# Patient Record
Sex: FEMALE | Race: WHITE | NOT HISPANIC OR LATINO | URBAN - METROPOLITAN AREA
[De-identification: names, ages, dates, MRNs, and addresses within clinical notes are randomized per-mention and may not be internally consistent; named-entity substitution may affect disease eponyms.]

---
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
## 2020-12-03 ENCOUNTER — APPOINTMENT (OUTPATIENT)
Dept: CARDIOLOGY | Facility: CLINIC | Age: 20
End: 2020-12-03
Payer: COMMERCIAL

## 2020-12-03 VITALS
HEIGHT: 67 IN | WEIGHT: 212 LBS | BODY MASS INDEX: 33.27 KG/M2 | TEMPERATURE: 97.3 F | HEART RATE: 77 BPM | DIASTOLIC BLOOD PRESSURE: 56 MMHG | SYSTOLIC BLOOD PRESSURE: 98 MMHG

## 2020-12-03 DIAGNOSIS — R00.2 PALPITATIONS: ICD-10-CM

## 2020-12-03 DIAGNOSIS — R07.9 CHEST PAIN, UNSPECIFIED: ICD-10-CM

## 2020-12-03 PROCEDURE — 99214 OFFICE O/P EST MOD 30 MIN: CPT

## 2020-12-03 PROCEDURE — 99072 ADDL SUPL MATRL&STAF TM PHE: CPT

## 2020-12-03 PROCEDURE — 93000 ELECTROCARDIOGRAM COMPLETE: CPT

## 2020-12-03 RX ORDER — ALBUTEROL SULFATE 90 UG/1
108 (90 BASE) AEROSOL, METERED RESPIRATORY (INHALATION) AS DIRECTED
Refills: 0 | Status: ACTIVE | COMMUNITY

## 2020-12-30 PROBLEM — R00.2 PALPITATIONS: Status: ACTIVE | Noted: 2018-07-16

## 2020-12-30 PROBLEM — R07.9 CHEST PAIN: Status: ACTIVE | Noted: 2020-12-30

## 2020-12-30 NOTE — DISCUSSION/SUMMARY
[FreeTextEntry1] : No further cardiac testing required at present.\par Clinical monitoring.\par Exercise / weight loss.\par PMD follow-up.\par Follow-up prn.

## 2020-12-30 NOTE — HISTORY OF PRESENT ILLNESS
[FreeTextEntry1] : 18-year-old female presents for palpitations.\par \par Questionable ASD detected in utero. No other cardiac history. No clear risk factors.\par \par Palpitations for approximately one year. 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 Generally brief (seconds). Not exertional. No clear precipitants.  No excessive stimulants.  Occasionally associated with lightheadedness. No syncope.\par \par Pulse 115 when taken in class on pulse oximeter (training).  Subsequently obtained similar reading on own. \par \par Exercises regularly (aerobics / light calisthenics) without symptoms. No chest pain. Breathing comfortable. \par \par No recent cardiac testing\par \par 5/29/18 labs reviewed:\par      HbA1c 5.3\par CBC,  CMP, TSH otherwise unremarkable.\par    HDL 77

## 2020-12-30 NOTE — REASON FOR VISIT
[Palpitations] : palpitations [Follow-Up - Clinic] : a clinic follow-up of [Chest Pain] : chest pain [FreeTextEntry1] : Last seen 2-y ago.\par \par Overall, feels well.\par \par New chest discomfort.  Random / occurs at rest.  Lower chest extending into back and b/l flanks.  Self-limited.  Lasts several minutes.  No exacerbating alleviating.  No associated symptoms.  Never exertional.  Finishing college (now remote at home) and father is ill, but does not correlate with stress.\par \par Was exercising up to pandemic without exertional symptoms (cardio / weights).\par \par Breathing comfortable.\par \par Stable mild palpitations.  Overall improved.  No lightheadedness / syncope.\par \par Occasional GERD symptoms.\par \par ECHO (7/16/18): nL chambers / biventricular function.  Mild PI.

## 2020-12-30 NOTE — ASSESSMENT
[FreeTextEntry1] : Palpitations likely benign extrasystoles or ST.\par Improved.  Reassuring ECHO.\par \par Chest pain not anginal.\par Likely musculoskeletal.  Possibly GERD.\par Very low pretest probability for CAD.\par \par ECHO (7/16/18): nL chambers / biventricular function.  Mild PI.

## 2021-11-08 ENCOUNTER — APPOINTMENT (OUTPATIENT)
Dept: OTOLARYNGOLOGY | Facility: CLINIC | Age: 21
End: 2021-11-08
Payer: COMMERCIAL

## 2021-11-08 VITALS — WEIGHT: 180 LBS | BODY MASS INDEX: 28.93 KG/M2 | HEIGHT: 66 IN

## 2021-11-08 DIAGNOSIS — H93.8X2 OTHER SPECIFIED DISORDERS OF LEFT EAR: ICD-10-CM

## 2021-11-08 DIAGNOSIS — J34.89 OTHER SPECIFIED DISORDERS OF NOSE AND NASAL SINUSES: ICD-10-CM

## 2021-11-08 PROCEDURE — G0268 REMOVAL OF IMPACTED WAX MD: CPT

## 2021-11-08 PROCEDURE — 92550 TYMPANOMETRY & REFLEX THRESH: CPT

## 2021-11-08 PROCEDURE — 92557 COMPREHENSIVE HEARING TEST: CPT

## 2021-11-08 PROCEDURE — 31231 NASAL ENDOSCOPY DX: CPT

## 2021-11-08 PROCEDURE — 99203 OFFICE O/P NEW LOW 30 MIN: CPT | Mod: 25

## 2021-11-08 NOTE — PHYSICAL EXAM
[Midline] : trachea located in midline position [Normal] : no rashes [de-identified] : cerumen impaction left ear, removed with curette and suction

## 2021-11-08 NOTE — HISTORY OF PRESENT ILLNESS
[de-identified] : Patient presents today c/o clogged left ear, scabbing in the right nostril. Patient admits using Q tip in the left ear on Saturday. No pain. \par \par Patient also c/o scabbing in the right nostril. No epistaxis. Some nasal congestion.

## 2021-11-08 NOTE — REASON FOR VISIT
[Initial Evaluation] : an initial evaluation for [FreeTextEntry2] : clogged left ear, scabbing in the right nostril

## 2021-11-08 NOTE — ASSESSMENT
[FreeTextEntry1] : I reviewed, interpreted, and discussed the Audiogram done today. WNL.\par \par \par Right septal scab for 3 days. Use bacitracin for 1 week. RTC in 1 week if persistent.

## 2022-02-08 ENCOUNTER — NON-APPOINTMENT (OUTPATIENT)
Age: 22
End: 2022-02-08

## 2022-02-19 ENCOUNTER — APPOINTMENT (OUTPATIENT)
Age: 22
End: 2022-02-19
Payer: COMMERCIAL

## 2022-02-19 ENCOUNTER — OUTPATIENT (OUTPATIENT)
Dept: OUTPATIENT SERVICES | Facility: HOSPITAL | Age: 22
LOS: 1 days | Discharge: HOME | End: 2022-02-19
Payer: COMMERCIAL

## 2022-02-19 VITALS
WEIGHT: 194 LBS | OXYGEN SATURATION: 98 % | HEIGHT: 66 IN | HEART RATE: 61 BPM | BODY MASS INDEX: 31.18 KG/M2 | SYSTOLIC BLOOD PRESSURE: 100 MMHG | DIASTOLIC BLOOD PRESSURE: 60 MMHG

## 2022-02-19 DIAGNOSIS — R05.9 COUGH, UNSPECIFIED: ICD-10-CM

## 2022-02-19 DIAGNOSIS — Z86.59 PERSONAL HISTORY OF OTHER MENTAL AND BEHAVIORAL DISORDERS: ICD-10-CM

## 2022-02-19 PROCEDURE — 71045 X-RAY EXAM CHEST 1 VIEW: CPT | Mod: 26

## 2022-02-19 PROCEDURE — 99203 OFFICE O/P NEW LOW 30 MIN: CPT

## 2022-02-19 RX ORDER — ALBUTEROL SULFATE 90 UG/1
108 (90 BASE) INHALANT RESPIRATORY (INHALATION)
Qty: 3 | Refills: 3 | Status: ACTIVE | COMMUNITY
Start: 2022-02-19 | End: 1900-01-01

## 2022-02-19 RX ORDER — BENZONATATE 200 MG/1
200 CAPSULE ORAL
Qty: 21 | Refills: 0 | Status: ACTIVE | COMMUNITY
Start: 2022-02-19 | End: 1900-01-01

## 2022-02-19 NOTE — REVIEW OF SYSTEMS
[Cough] : cough [Wheezing] : wheezing [Anxiety] : anxiety [TextBox_14] : episodes of runny nose  [Negative] : Endocrine

## 2022-02-19 NOTE — PHYSICAL EXAM
[No Acute Distress] : no acute distress [Normal Oropharynx] : normal oropharynx [II] : Mallampati Class: II [Normal Appearance] : normal appearance [No Neck Mass] : no neck mass [Normal Rate/Rhythm] : normal rate/rhythm [No Murmurs] : no murmurs [Normal S1, S2] : normal s1, s2 [No Resp Distress] : no resp distress [Clear to Auscultation Bilaterally] : clear to auscultation bilaterally [No Abnormalities] : no abnormalities [Normal Gait] : normal gait [No Clubbing] : no clubbing [No Cyanosis] : no cyanosis [FROM] : FROM [No Edema] : no edema [No Focal Deficits] : no focal deficits [Oriented x3] : oriented x3 [Normal Affect] : normal affect

## 2022-05-14 ENCOUNTER — APPOINTMENT (OUTPATIENT)
Age: 22
End: 2022-05-14
Payer: COMMERCIAL

## 2022-05-14 VITALS
HEIGHT: 66 IN | RESPIRATION RATE: 14 BRPM | BODY MASS INDEX: 31.66 KG/M2 | WEIGHT: 197 LBS | DIASTOLIC BLOOD PRESSURE: 70 MMHG | HEART RATE: 78 BPM | OXYGEN SATURATION: 98 % | SYSTOLIC BLOOD PRESSURE: 120 MMHG

## 2022-05-14 DIAGNOSIS — J45.991 COUGH VARIANT ASTHMA: ICD-10-CM

## 2022-05-14 PROCEDURE — 99213 OFFICE O/P EST LOW 20 MIN: CPT

## 2022-05-14 NOTE — PHYSICAL EXAM
[No Acute Distress] : no acute distress [Normal Oropharynx] : normal oropharynx [II] : Mallampati Class: II [Normal Appearance] : normal appearance [No Neck Mass] : no neck mass [Normal Rate/Rhythm] : normal rate/rhythm [No Murmurs] : no murmurs [Normal S1, S2] : normal s1, s2 [No Resp Distress] : no resp distress [Clear to Auscultation Bilaterally] : clear to auscultation bilaterally [No Abnormalities] : no abnormalities [Normal Gait] : normal gait [No Clubbing] : no clubbing [No Cyanosis] : no cyanosis [No Edema] : no edema [FROM] : FROM [No Focal Deficits] : no focal deficits [Oriented x3] : oriented x3 [Normal Affect] : normal affect

## 2022-05-14 NOTE — REVIEW OF SYSTEMS
[Cough] : cough [Wheezing] : wheezing [Anxiety] : anxiety [Negative] : Endocrine [TextBox_14] : episodes of runny nose

## 2022-07-07 ENCOUNTER — OUTPATIENT (OUTPATIENT)
Dept: OUTPATIENT SERVICES | Facility: HOSPITAL | Age: 22
LOS: 1 days | Discharge: HOME | End: 2022-07-07

## 2022-07-07 DIAGNOSIS — J45.991 COUGH VARIANT ASTHMA: ICD-10-CM

## 2022-07-07 PROCEDURE — 94727 GAS DIL/WSHOT DETER LNG VOL: CPT | Mod: 26

## 2022-07-07 PROCEDURE — 94729 DIFFUSING CAPACITY: CPT | Mod: 26

## 2022-07-07 PROCEDURE — 94060 EVALUATION OF WHEEZING: CPT | Mod: 26

## 2022-08-06 ENCOUNTER — APPOINTMENT (OUTPATIENT)
Age: 22
End: 2022-08-06

## 2023-06-15 RX ORDER — FLUTICASONE FUROATE AND VILANTEROL TRIFENATATE 100; 25 UG/1; UG/1
100-25 POWDER RESPIRATORY (INHALATION)
Qty: 1 | Refills: 1 | Status: ACTIVE | COMMUNITY
Start: 2022-02-19 | End: 1900-01-01

## 2023-06-27 ENCOUNTER — NON-APPOINTMENT (OUTPATIENT)
Age: 23
End: 2023-06-27

## 2023-06-28 ENCOUNTER — APPOINTMENT (OUTPATIENT)
Dept: OTOLARYNGOLOGY | Facility: CLINIC | Age: 23
End: 2023-06-28
Payer: COMMERCIAL

## 2023-06-28 PROCEDURE — 99214 OFFICE O/P EST MOD 30 MIN: CPT | Mod: 25

## 2023-06-28 PROCEDURE — 31575 DIAGNOSTIC LARYNGOSCOPY: CPT

## 2023-06-28 RX ORDER — FAMOTIDINE 20 MG/1
20 TABLET, FILM COATED ORAL
Qty: 90 | Refills: 2 | Status: ACTIVE | COMMUNITY
Start: 2023-06-28 | End: 1900-01-01

## 2023-06-28 RX ORDER — FLUTICASONE PROPIONATE 50 UG/1
50 SPRAY, METERED NASAL
Qty: 1 | Refills: 4 | Status: ACTIVE | COMMUNITY
Start: 2023-06-28 | End: 1900-01-01

## 2023-06-28 NOTE — HISTORY OF PRESENT ILLNESS
[FreeTextEntry1] : \par Swollen tonsils with white spots that occurs monthly for last 4 months, was tested for strep & mono by [facility] [place] which was (-). Denies fevers, chills. Had difficulty swallowing and eating due to increased pain that last for about a week. Prescribed Amoxicillin with no improvement.

## 2023-06-28 NOTE — PROCEDURE
[None] : none [Flexible Endoscope] : examined with the flexible endoscope [Normal] : normal pyriform sinuses without saliva pooling [de-identified] : throat discomfort

## 2023-06-28 NOTE — ASSESSMENT
[FreeTextEntry1] : Discussed pathophysiology of tonsil stones and recommended gargling with baking soda and using waterpick.\par \par RTC in2M to evaluate if she needs surgery. \par

## 2023-08-21 ENCOUNTER — APPOINTMENT (OUTPATIENT)
Dept: OTOLARYNGOLOGY | Facility: CLINIC | Age: 23
End: 2023-08-21
Payer: COMMERCIAL

## 2023-08-21 DIAGNOSIS — R49.0 DYSPHONIA: ICD-10-CM

## 2023-08-21 PROCEDURE — 99214 OFFICE O/P EST MOD 30 MIN: CPT | Mod: 25

## 2023-08-21 PROCEDURE — 31575 DIAGNOSTIC LARYNGOSCOPY: CPT

## 2023-08-21 NOTE — ASSESSMENT
[FreeTextEntry1] : Recurrent acute pharyngitis. No clear history of strep.   Recurrent tonsil stones.  Patient aware of risks and benefits from tonsillectomy regaring her tonsil stones. SHe understands she will continue to have viral pharyngitis after surgery.

## 2023-08-21 NOTE — PHYSICAL EXAM
[Normal] : mucosa is normal [Midline] : trachea located in midline position [de-identified] : hypertrophic

## 2023-08-21 NOTE — HISTORY OF PRESENT ILLNESS
[FreeTextEntry1] : Patient returns today c/o throat discomfort, tonsil stone, PND. Fluticasone and Famotidine .   Patient states in July she lost her voice for 4 days and a few days ago she had a tonsil infection. The right tonsil is the worst. Reports having 7 throat infections, but was given antibiotics for 1 of the occurrences.  Patient took flonase, famotidine, salt water and baking soda gargles without improvement of throat discomfort and PND.

## 2023-08-21 NOTE — REASON FOR VISIT
[Subsequent Evaluation] : a subsequent evaluation for [FreeTextEntry2] : throat discomfort, tonsil stone, PND

## 2023-09-12 ENCOUNTER — APPOINTMENT (OUTPATIENT)
Dept: NEUROLOGY | Facility: CLINIC | Age: 23
End: 2023-09-12

## 2023-10-06 DIAGNOSIS — K58.0 IRRITABLE BOWEL SYNDROME WITH DIARRHEA: ICD-10-CM

## 2023-10-06 RX ORDER — DICYCLOMINE HYDROCHLORIDE 10 MG/1
10 CAPSULE ORAL 3 TIMES DAILY
Qty: 90 | Refills: 0 | Status: ACTIVE | COMMUNITY
Start: 2023-10-06 | End: 1900-01-01

## 2023-10-31 ENCOUNTER — RX RENEWAL (OUTPATIENT)
Age: 23
End: 2023-10-31
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
## 2023-11-16 RX ORDER — CEPHALEXIN 500 MG/1
500 CAPSULE ORAL 3 TIMES DAILY
Qty: 21 | Refills: 0 | Status: ACTIVE | COMMUNITY
Start: 2023-11-16 | End: 1900-01-01

## 2023-12-06 ENCOUNTER — APPOINTMENT (OUTPATIENT)
Dept: OTOLARYNGOLOGY | Facility: CLINIC | Age: 23
End: 2023-12-06
Payer: COMMERCIAL

## 2023-12-06 DIAGNOSIS — J35.8 OTHER CHRONIC DISEASES OF TONSILS AND ADENOIDS: ICD-10-CM

## 2023-12-06 DIAGNOSIS — R09.82 POSTNASAL DRIP: ICD-10-CM

## 2023-12-06 DIAGNOSIS — R07.0 PAIN IN THROAT: ICD-10-CM

## 2023-12-06 PROCEDURE — 31575 DIAGNOSTIC LARYNGOSCOPY: CPT

## 2023-12-06 PROCEDURE — 99213 OFFICE O/P EST LOW 20 MIN: CPT | Mod: 25

## 2023-12-20 ENCOUNTER — TRANSCRIPTION ENCOUNTER (OUTPATIENT)
Age: 23
End: 2023-12-20

## 2024-01-02 ENCOUNTER — LABORATORY RESULT (OUTPATIENT)
Age: 24
End: 2024-01-02

## 2024-01-02 ENCOUNTER — OUTPATIENT (OUTPATIENT)
Dept: OUTPATIENT SERVICES | Facility: HOSPITAL | Age: 24
LOS: 1 days | End: 2024-01-02
Payer: COMMERCIAL

## 2024-01-02 ENCOUNTER — APPOINTMENT (OUTPATIENT)
Dept: HEMATOLOGY ONCOLOGY | Facility: CLINIC | Age: 24
End: 2024-01-02
Payer: COMMERCIAL

## 2024-01-02 VITALS
BODY MASS INDEX: 37.12 KG/M2 | HEIGHT: 66 IN | DIASTOLIC BLOOD PRESSURE: 65 MMHG | SYSTOLIC BLOOD PRESSURE: 118 MMHG | TEMPERATURE: 98.3 F | HEART RATE: 85 BPM | RESPIRATION RATE: 14 BRPM | WEIGHT: 231 LBS

## 2024-01-02 DIAGNOSIS — D72.819 DECREASED WHITE BLOOD CELL COUNT, UNSPECIFIED: ICD-10-CM

## 2024-01-02 DIAGNOSIS — R70.0 ELEVATED ERYTHROCYTE SEDIMENTATION RATE: ICD-10-CM

## 2024-01-02 LAB
HCT VFR BLD CALC: 41.7 %
HGB BLD-MCNC: 13.8 G/DL
MCHC RBC-ENTMCNC: 26.2 PG
MCHC RBC-ENTMCNC: 33.1 G/DL
MCV RBC AUTO: 79.3 FL
PLATELET # BLD AUTO: 431 K/UL
PMV BLD: 9.6 FL
RBC # BLD: 5.26 M/UL
RBC # FLD: 14 %
WBC # FLD AUTO: 6.42 K/UL

## 2024-01-02 PROCEDURE — 86140 C-REACTIVE PROTEIN: CPT

## 2024-01-02 PROCEDURE — 84443 ASSAY THYROID STIM HORMONE: CPT

## 2024-01-02 PROCEDURE — 81001 URINALYSIS AUTO W/SCOPE: CPT

## 2024-01-02 PROCEDURE — 99244 OFF/OP CNSLTJ NEW/EST MOD 40: CPT

## 2024-01-02 PROCEDURE — 86880 COOMBS TEST DIRECT: CPT

## 2024-01-02 PROCEDURE — 85652 RBC SED RATE AUTOMATED: CPT

## 2024-01-02 PROCEDURE — 84156 ASSAY OF PROTEIN URINE: CPT

## 2024-01-02 PROCEDURE — 86225 DNA ANTIBODY NATIVE: CPT

## 2024-01-02 PROCEDURE — 82550 ASSAY OF CK (CPK): CPT

## 2024-01-02 PROCEDURE — 99204 OFFICE O/P NEW MOD 45 MIN: CPT

## 2024-01-02 PROCEDURE — 82570 ASSAY OF URINE CREATININE: CPT

## 2024-01-02 PROCEDURE — 83516 IMMUNOASSAY NONANTIBODY: CPT

## 2024-01-02 PROCEDURE — 85027 COMPLETE CBC AUTOMATED: CPT

## 2024-01-02 PROCEDURE — 80053 COMPREHEN METABOLIC PANEL: CPT

## 2024-01-02 PROCEDURE — 86235 NUCLEAR ANTIGEN ANTIBODY: CPT

## 2024-01-03 DIAGNOSIS — D72.819 DECREASED WHITE BLOOD CELL COUNT, UNSPECIFIED: ICD-10-CM

## 2024-01-03 LAB
ALBUMIN SERPL ELPH-MCNC: 4.2 G/DL
ALP BLD-CCNC: 84 U/L
ALT SERPL-CCNC: 11 U/L
ANION GAP SERPL CALC-SCNC: 11 MMOL/L
APPEARANCE: CLEAR
AST SERPL-CCNC: 13 U/L
BILIRUB SERPL-MCNC: 0.4 MG/DL
BILIRUBIN URINE: NEGATIVE
BLOOD URINE: ABNORMAL
BUN SERPL-MCNC: 12 MG/DL
CALCIUM SERPL-MCNC: 9.6 MG/DL
CHLORIDE SERPL-SCNC: 102 MMOL/L
CK SERPL-CCNC: 60 U/L
CO2 SERPL-SCNC: 25 MMOL/L
COLOR: YELLOW
CREAT SERPL-MCNC: 0.9 MG/DL
CREAT SPEC-SCNC: 221 MG/DL
CREAT/PROT UR: 0.1 RATIO
CRP SERPL-MCNC: 21.9 MG/L
DIRECT COOMBS: NORMAL
EGFR: 92 ML/MIN/1.73M2
ENA RNP AB SER IA-ACNC: 1.2 AL
ENA SCL70 IGG SER IA-ACNC: <0.2 AL
ERYTHROCYTE [SEDIMENTATION RATE] IN BLOOD BY WESTERGREN METHOD: 24 MM/HR
GLUCOSE QUALITATIVE U: NEGATIVE MG/DL
GLUCOSE SERPL-MCNC: 86 MG/DL
KETONES URINE: NEGATIVE MG/DL
LEUKOCYTE ESTERASE URINE: NEGATIVE
NITRITE URINE: NEGATIVE
PH URINE: 5.5
POTASSIUM SERPL-SCNC: 4.5 MMOL/L
PROT SERPL-MCNC: 7.5 G/DL
PROT UR-MCNC: 12 MG/DLG/24H
PROTEIN URINE: NEGATIVE MG/DL
SODIUM SERPL-SCNC: 138 MMOL/L
SPECIFIC GRAVITY URINE: 1.03
TSH SERPL-ACNC: 1.12 UIU/ML
UROBILINOGEN URINE: 0.2 MG/DL

## 2024-01-03 NOTE — CONSULT LETTER
[Dear  ___] : Dear  [unfilled], [Consult Letter:] : I had the pleasure of evaluating your patient, [unfilled]. [Please see my note below.] : Please see my note below. [Consult Closing:] : Thank you very much for allowing me to participate in the care of this patient.  If you have any questions, please do not hesitate to contact me. [Sincerely,] : Sincerely, [FreeTextEntry3] : Dr. KARIN Navarro

## 2024-01-03 NOTE — HISTORY OF PRESENT ILLNESS
[de-identified] : cc: I have elevated ESR/CRP  23F w/a history of anxiety presents to the hematology clinic due to elevated ESR and CRP.   History goes back to November when pt had a persistent sore throat. Workup including strep, EBV, and COVID testing were negative. At the end of November, she subsequently developed three indurations on her left forearm. On 12/10/23, she also developed R outer ear swelling, and on 12/18/23, she developed R cervical lymphadenopathy. She went to her PCP and had a series of bloodwork, including ESR, CRP, thyroglobulin antibodies, and EFREN, which were all elevated. ESR - 28, CRP- 22.5, EFREN - 1:160, thyroglobulin ab - 127, and thyroid peroxide ab - 328. She also had lymphopenia with an ALC of 630. She was sent to hematology for further evaluation.  Today, the pt reports the resolution of her R ear swelling, R lymphadenopathy, and L forearm indurations with residual scarring. She presents with images of her ear, which appeared to be perichondritis with associated R cervical lymphadenopathy. She states she still has a persistent sore throat. She notices malar rashes on her face. Otherwise, she denies fever, chill, night sweats, CP, bowel movement changes, and joint pain.   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 She lives in a dorm with three other roommates. One of her roommates has a dog. She gained 30 lbs over 1 year.   PSH: None Social: social alcohol use, denies illicit drug use HCM: Had injectable birth control  FHx: Grandfather (maternal) - CAD, Grandmother (maternal) - scleroderma, and mother -Trophoblastic tumor

## 2024-01-03 NOTE — ASSESSMENT
[FreeTextEntry1] : 23F presents to the hematology clinic for elevation for elevated ESR/CRP.   # Elevated ESR/CRP associated with sore throat , auricular  perichondritis ? cervical adenopathy , painful nodular skin lesions , all nearly resolved. - Labs and physical exam findings, including leukopenia , elevated EFREN, thyroglobulin ab, and thyroperoxidase ab,  malar rash ? rule out infectious v/s autoimmune disorders ( positive family history ) - We also explained that ESR/CRP elevation is non-specific and can be elevated due to infection, obesity, autoimmune disorder, and malignancy.  - will check anti-Scl-70, anti-RNA polymerase III antibody, EFREN, as well as CK level  - will repeat ESR/CRP  - will check UA, urine protein/cr ratio, and direct lupillo - consider rheumatology evaluation .   # Elevated thyroglobulin ab and thyroid peroxide ab r/o graves dz and Hashimoto dz  - check TSH and FT4  # Lymphopenia, resolved on repeat CBC today   RTC based on the above workup result.

## 2024-01-03 NOTE — REVIEW OF SYSTEMS
[Recent Change In Weight] : ~T recent weight change [Negative] : Endocrine [Fever] : no fever [Chills] : no chills [Night Sweats] : no night sweats [Fatigue] : no fatigue [Easy Bleeding] : no tendency for easy bleeding [Easy Bruising] : no tendency for easy bruising [FreeTextEntry2] : gained 30 lbs over 1 year [de-identified] : R cervical lymphadenopathy, resolved

## 2024-01-03 NOTE — REASON FOR VISIT
[Initial Consultation] : an initial consultation for [Parent] : parent [FreeTextEntry2] : Elevated ESR/CRP

## 2024-01-03 NOTE — PHYSICAL EXAM
[facility] -- Stefano Emmanuel, Aleksandar, Ricky, Ramsey Quan Savella  Office # [phone]      Follow Up:  BOB    Subjective/Observations: Patient seen and examined. Events noted. Resting comfortably in bed. No complaints of chest pain, dyspnea, or palpitations reported. No signs of orthopnea or PND. s/p right renal PCI      REVIEW OF SYSTEMS: All other review of systems is negative unless indicated above    PAST MEDICAL & SURGICAL HISTORY:  Renal artery stenosis: possible on doppler  Basal cell carcinoma  Mitral valve insufficiency, unspecified etiology: Cardiac cath on 11/21/18  Edema, unspecified type  Cerebrovascular accident (CVA), unspecified mechanism  Hyponatremia: ON HCTZ , h/o Hypokelemia  Dyslipidemia  Essential hypertension  UTI (urinary tract infection)  H/O bilateral hip replacements  History of cholecystectomy  Status post hysterectomy  S/P Mohs surgery for basal cell carcinoma      MEDICATIONS  (STANDING):  aspirin  chewable 81 milliGRAM(s) Oral daily  atorvastatin 40 milliGRAM(s) Oral at bedtime  cloNIDine 0.2 milliGRAM(s) Oral three times a day  clopidogrel Tablet 75 milliGRAM(s) Oral daily  hydrALAZINE 25 milliGRAM(s) Oral every 8 hours  losartan 50 milliGRAM(s) Oral every 12 hours  sodium chloride 0.9%. 250 milliLiter(s) (250 mL/Hr) IV Continuous <Continuous>  sodium chloride 0.9%. 1000 milliLiter(s) (75 mL/Hr) IV Continuous <Continuous>    MEDICATIONS  (PRN):      Allergies    Keflex (Unknown)  verapamil (Other)    Intolerances            Vital Signs Last 24 Hrs  T(C): 36.6 (19 Dec 2018 05:14), Max: 36.8 (18 Dec 2018 21:20)  T(F): 97.9 (19 Dec 2018 05:14), Max: 98.3 (18 Dec 2018 21:20)  HR: 76 (19 Dec 2018 06:40) (61 - 91)  BP: 134/65 (19 Dec 2018 06:40) (134/65 - 194/88)  BP(mean): --  RR: 16 (19 Dec 2018 05:14) (16 - 18)  SpO2: 99% (19 Dec 2018 05:14) (94% - 99%)    I&O's Summary    18 Dec 2018 07:01  -  19 Dec 2018 07:00  --------------------------------------------------------  IN: 360 mL / OUT: 650 mL / NET: -290 mL      Weight (kg): 51.7 (12-18 @ 16:50)    PHYSICAL EXAM:  TELE:   Constitutional: NAD, awake and alert, well-developed  HEENT: Moist Mucous Membranes, Anicteric  Pulmonary: Decreased breath sounds b/l. No rales, crackles or wheeze appreciated.   Cardiovascular: Regular, S1 and S2, 1/6 SM  Gastrointestinal: Bowel Sounds present, soft, nontender.   Lymph: trace peripheral edema. No lymphadenopathy.  Skin: No visible rashes or ulcers.  Psych:  Mood & affect appropriate for situation    LABS: All Labs Reviewed:                        10.9   6.5   )-----------( 326      ( 19 Dec 2018 00:35 )             34.1                         11.4   6.00  )-----------( 323      ( 18 Dec 2018 08:10 )             36.6                         10.6   6.78  )-----------( 333      ( 17 Dec 2018 07:46 )             33.7     19 Dec 2018 06:28    144    |  108    |  29     ----------------------------<  131    4.0     |  24     |  0.90   19 Dec 2018 00:35    144    |  107    |  33     ----------------------------<  128    5.5     |  26     |  1.02   18 Dec 2018 06:48    142    |  104    |  45     ----------------------------<  118    3.8     |  25     |  1.00     Ca    9.0        19 Dec 2018 06:28  Ca    9.5        19 Dec 2018 00:35  Ca    9.6        18 Dec 2018 06:48  Mg     2.0       16 Dec 2018 07:30 [Fully active, able to carry on all pre-disease performance without restriction] : Status 0 - Fully active, able to carry on all pre-disease performance without restriction [Obese] : obese [Normal] : affect appropriate [de-identified] : Malar rash noted on face  [de-identified] : Cannot appreciated R cervical lymphadenopathy [de-identified] : L forearm residual scarring from induration

## 2024-01-04 LAB — DSDNA AB SER-ACNC: <12 IU/ML

## 2024-01-05 LAB — RNA POLYMERASE III IGG: 32 UNITS

## 2024-06-07 ENCOUNTER — APPOINTMENT (OUTPATIENT)
Dept: OTOLARYNGOLOGY | Facility: CLINIC | Age: 24
End: 2024-06-07